# Patient Record
Sex: FEMALE | Race: AMERICAN INDIAN OR ALASKA NATIVE
[De-identification: names, ages, dates, MRNs, and addresses within clinical notes are randomized per-mention and may not be internally consistent; named-entity substitution may affect disease eponyms.]

---

## 2020-08-14 ENCOUNTER — HOSPITAL ENCOUNTER (INPATIENT)
Dept: HOSPITAL 46 - FBC | Age: 30
LOS: 2 days | Discharge: HOME | End: 2020-08-16
Attending: OBSTETRICS & GYNECOLOGY | Admitting: OBSTETRICS & GYNECOLOGY
Payer: COMMERCIAL

## 2020-08-14 VITALS — BODY MASS INDEX: 30.16 KG/M2 | HEIGHT: 65 IN | WEIGHT: 181 LBS

## 2020-08-14 DIAGNOSIS — Z79.899: ICD-10-CM

## 2020-08-14 DIAGNOSIS — Z87.891: ICD-10-CM

## 2020-08-14 DIAGNOSIS — F41.9: ICD-10-CM

## 2020-08-14 DIAGNOSIS — J45.20: ICD-10-CM

## 2020-08-14 DIAGNOSIS — F32.9: ICD-10-CM

## 2020-08-14 DIAGNOSIS — Z3A.37: ICD-10-CM

## 2020-08-14 PROCEDURE — 3E0R3BZ INTRODUCTION OF ANESTHETIC AGENT INTO SPINAL CANAL, PERCUTANEOUS APPROACH: ICD-10-PCS | Performed by: NURSE ANESTHETIST, CERTIFIED REGISTERED

## 2020-08-14 PROCEDURE — A9270 NON-COVERED ITEM OR SERVICE: HCPCS

## 2020-08-14 PROCEDURE — 00HU33Z INSERTION OF INFUSION DEVICE INTO SPINAL CANAL, PERCUTANEOUS APPROACH: ICD-10-PCS | Performed by: NURSE ANESTHETIST, CERTIFIED REGISTERED

## 2020-08-14 PROCEDURE — 0UQMXZZ REPAIR VULVA, EXTERNAL APPROACH: ICD-10-PCS | Performed by: OBSTETRICS & GYNECOLOGY

## 2020-08-14 PROCEDURE — 3E0P7VZ INTRODUCTION OF HORMONE INTO FEMALE REPRODUCTIVE, VIA NATURAL OR ARTIFICIAL OPENING: ICD-10-PCS | Performed by: OBSTETRICS & GYNECOLOGY

## 2020-08-15 NOTE — PR
[facility]
                                    [address] [facility] [address]
                                  [place], [place]  [address]
_________________________________________________________________________________________
                                                                 Signed   
 
 
===================================
PP Progress Notes
===================================
Datetime Report Generated by CPESPERANZA: 08/15/2020 10:21
   
SUBJECTIVE:  [id number]
Pain:  Within Normal Limits
Vital Signs:  [id number]
Vital Signs:  Reviewed; Within Normal Limits
POSTPARTUM EXAM:  Ongoing
Cardiovascular:  Not Done
Respiratory:  Not Done
Abdomen/Uterus:  Abnormal
Lochia:  Normal
Vulva/Perineum:  Not Done
Breasts:  Not Done
CVA Tenderness:  Not Done
Extremities:  Normal
 Incision:  Not Applicable
Breastfeeding Progress:  Normal
Exam Comments:  Fundus firm, NT @ U-1.
      
   H/H 10., WBC 10.2, plat 243k
IMPRESSION/PLAN/PROCEDURES:  [id number]
Impression:  Normal Postpartum Progression
Plan:  Continue Present Management
Procedures:  None
Progress Notes:  Doing well.  Will continue present care with D/C in am.
Signing Physician:  Steve Templeton MD
 
 
Copies:                                
~
 
 
 
 
 
 
 
 
 
 
*Electronically Signed*  08/15/20  1021  STEVE TEMPLETON MD            
                                                                       
_________________________________________________________________________________________
PATIENT NAME:     OTTO RAMIRES                
MEDICAL RECORD #: [id number]                     PROGRESS NOTE                 
          ACCT #: [id number]  
DATE OF BIRTH:   [birth year]                                       
PHYSICIAN:   STEVE TEMPLETON MD                   RPT #: [id number]
REPORT IS CONFIDENTIAL AND NOT TO BE RELEASED WITHOUT AUTHORIZATION

## 2020-08-16 NOTE — PR
[facility]
                                    [address] [facility]
                                  [place], [place]  [address]
_________________________________________________________________________________________
                                                                 Signed   
 
 
===================================
PP Progress Notes
===================================
Datetime Report Generated by CPN: 2020 07:56
   
SUBJECTIVE:  [id number]
Pain:  Within Normal Limits
Vital Signs:  [id number]
Vital Signs:  Reviewed; Within Normal Limits
POSTPARTUM EXAM:  Ongoing
Cardiovascular:  Not Done
Respiratory:  Not Done
Abdomen/Uterus:  Abnormal
Lochia:  Normal
Vulva/Perineum:  Not Done
Breasts:  Not Done
CVA Tenderness:  Not Done
Extremities:  Normal
 Incision:  Not Applicable
Breastfeeding Progress:  Normal
Exam Comments:  Fundus firm, NT @ U-1.
IMPRESSION/PLAN/PROCEDURES:  [id number]
Impression:  Normal Postpartum Progression
Plan:  Discharge
Procedures:  None
Progress Notes:  Doing well.  She is ready for D/C.
Signing Physician:  Octavia Templeton MD
 
 
Copies:                                
~
 
 
 
 
 
 
 
 
 
 
 
 
*Electronically Signed*  20  0756  OCTAVIA TEMPLETON MD            
                                                                       
_________________________________________________________________________________________
PATIENT NAME:     OTTO RAMIRES                
MEDICAL RECORD #: [id number]                     PROGRESS NOTE                 
          ACCT #: [id number]  
DATE OF BIRTH:   [birth year]                                       
PHYSICIAN:   OCTAVIA TEMPLETON MD                   RPT #: [id number]
REPORT IS CONFIDENTIAL AND NOT TO BE RELEASED WITHOUT AUTHORIZATION